# Patient Record
Sex: FEMALE | Race: AMERICAN INDIAN OR ALASKA NATIVE
[De-identification: names, ages, dates, MRNs, and addresses within clinical notes are randomized per-mention and may not be internally consistent; named-entity substitution may affect disease eponyms.]

---

## 2020-07-31 ENCOUNTER — HOSPITAL ENCOUNTER (OUTPATIENT)
Dept: HOSPITAL 5 - ED | Age: 35
Setting detail: OBSERVATION
LOS: 2 days | Discharge: HOME | End: 2020-08-02
Attending: INTERNAL MEDICINE | Admitting: INTERNAL MEDICINE
Payer: SELF-PAY

## 2020-07-31 DIAGNOSIS — F17.210: ICD-10-CM

## 2020-07-31 DIAGNOSIS — N28.9: ICD-10-CM

## 2020-07-31 DIAGNOSIS — E72.20: ICD-10-CM

## 2020-07-31 DIAGNOSIS — F19.10: ICD-10-CM

## 2020-07-31 DIAGNOSIS — R41.82: Primary | ICD-10-CM

## 2020-07-31 DIAGNOSIS — E87.6: ICD-10-CM

## 2020-07-31 LAB
ALBUMIN SERPL-MCNC: 4.2 G/DL (ref 3.9–5)
ALT SERPL-CCNC: 10 UNITS/L (ref 7–56)
BACTERIA #/AREA URNS HPF: (no result) /HPF
BASOPHILS # (AUTO): 0 K/MM3 (ref 0–0.1)
BASOPHILS NFR BLD AUTO: 0.3 % (ref 0–1.8)
BENZODIAZEPINES SCREEN,URINE: (no result)
BILIRUB UR QL STRIP: (no result)
BLOOD UR QL VISUAL: (no result)
BUN SERPL-MCNC: 6 MG/DL (ref 7–17)
BUN/CREAT SERPL: 6 %
CALCIUM SERPL-MCNC: 9.6 MG/DL (ref 8.4–10.2)
EOSINOPHIL # BLD AUTO: 0 K/MM3 (ref 0–0.4)
EOSINOPHIL NFR BLD AUTO: 0.4 % (ref 0–4.3)
HCT VFR BLD CALC: 39.5 % (ref 30.3–42.9)
HEMOLYSIS INDEX: 10
HGB BLD-MCNC: 13.3 GM/DL (ref 10.1–14.3)
LYMPHOCYTES # BLD AUTO: 1.1 K/MM3 (ref 1.2–5.4)
LYMPHOCYTES NFR BLD AUTO: 13.3 % (ref 13.4–35)
MCHC RBC AUTO-ENTMCNC: 34 % (ref 30–34)
MCV RBC AUTO: 91 FL (ref 79–97)
METHADONE SCREEN,URINE: (no result)
MONOCYTES # (AUTO): 0.9 K/MM3 (ref 0–0.8)
MONOCYTES % (AUTO): 10.2 % (ref 0–7.3)
MUCOUS THREADS #/AREA URNS HPF: (no result) /HPF
OPIATE SCREEN,URINE: (no result)
PH UR STRIP: 6 [PH] (ref 5–7)
PLATELET # BLD: 245 K/MM3 (ref 140–440)
RBC # BLD AUTO: 4.33 M/MM3 (ref 3.65–5.03)
RBC #/AREA URNS HPF: 4 /HPF (ref 0–6)
SPERM #/AREA URNS HPF: (no result) /HPF
UROBILINOGEN UR-MCNC: 2 MG/DL (ref ?–2)
WBC #/AREA URNS HPF: 11 /HPF (ref 0–6)

## 2020-07-31 PROCEDURE — 81001 URINALYSIS AUTO W/SCOPE: CPT

## 2020-07-31 PROCEDURE — 80053 COMPREHEN METABOLIC PANEL: CPT

## 2020-07-31 PROCEDURE — 83735 ASSAY OF MAGNESIUM: CPT

## 2020-07-31 PROCEDURE — 96365 THER/PROPH/DIAG IV INF INIT: CPT

## 2020-07-31 PROCEDURE — 84443 ASSAY THYROID STIM HORMONE: CPT

## 2020-07-31 PROCEDURE — 82140 ASSAY OF AMMONIA: CPT

## 2020-07-31 PROCEDURE — 99285 EMERGENCY DEPT VISIT HI MDM: CPT

## 2020-07-31 PROCEDURE — 36415 COLL VENOUS BLD VENIPUNCTURE: CPT

## 2020-07-31 PROCEDURE — G0480 DRUG TEST DEF 1-7 CLASSES: HCPCS

## 2020-07-31 PROCEDURE — 70450 CT HEAD/BRAIN W/O DYE: CPT

## 2020-07-31 PROCEDURE — 85025 COMPLETE CBC W/AUTO DIFF WBC: CPT

## 2020-07-31 PROCEDURE — 73110 X-RAY EXAM OF WRIST: CPT

## 2020-07-31 PROCEDURE — 93005 ELECTROCARDIOGRAM TRACING: CPT

## 2020-07-31 PROCEDURE — 80320 DRUG SCREEN QUANTALCOHOLS: CPT

## 2020-07-31 PROCEDURE — 80307 DRUG TEST PRSMV CHEM ANLYZR: CPT

## 2020-07-31 PROCEDURE — 99406 BEHAV CHNG SMOKING 3-10 MIN: CPT

## 2020-07-31 PROCEDURE — 87086 URINE CULTURE/COLONY COUNT: CPT

## 2020-07-31 PROCEDURE — 84484 ASSAY OF TROPONIN QUANT: CPT

## 2020-07-31 PROCEDURE — 96368 THER/DIAG CONCURRENT INF: CPT

## 2020-07-31 PROCEDURE — 96366 THER/PROPH/DIAG IV INF ADDON: CPT

## 2020-07-31 PROCEDURE — G0378 HOSPITAL OBSERVATION PER HR: HCPCS

## 2020-07-31 PROCEDURE — 96375 TX/PRO/DX INJ NEW DRUG ADDON: CPT

## 2020-07-31 PROCEDURE — 80048 BASIC METABOLIC PNL TOTAL CA: CPT

## 2020-07-31 PROCEDURE — 71045 X-RAY EXAM CHEST 1 VIEW: CPT

## 2020-07-31 PROCEDURE — 82550 ASSAY OF CK (CPK): CPT

## 2020-07-31 PROCEDURE — 96372 THER/PROPH/DIAG INJ SC/IM: CPT

## 2020-07-31 RX ADMIN — POTASSIUM CHLORIDE SCH MLS/HR: 10 INJECTION, SOLUTION INTRAVENOUS at 22:52

## 2020-07-31 RX ADMIN — POTASSIUM CHLORIDE SCH MLS/HR: 10 INJECTION, SOLUTION INTRAVENOUS at 21:47

## 2020-07-31 NOTE — CAT SCAN REPORT
CT head/brain wo con



INDICATION / CLINICAL INFORMATION:

89 years Female; Altered mental status.



TECHNIQUE: Routine CT head without contrast. All CT scans at this location are performed using CT dos
e reduction for ALARA by means of automated exposure control.



COMPARISON: 

None.



FINDINGS:



BRAIN / INTRACRANIAL CONTENTS: No acute hemorrhage, mass effect, midline shift, hydrocephalus, or acu
te, large territorial infarct. No chronic infarct or atrophy appreciated. No significant white matter
 abnormality.



CRANIOCERVICAL JUNCTION: No significant abnormality.



ORBITS: No significant abnormality of visualized orbits.

SINUSES / MASTOIDS: No significant abnormality in the visualized paranasal sinuses or mastoid air pamela
ls.



ADDITIONAL FINDINGS: None. 



IMPRESSION:

1. No focal mass, hemorrhage, hydrocephalus, or acute, large territorial infarct. 



Signer Name: Anderson Fernandez MD, III 

Signed: 7/31/2020 9:51 PM

Workstation Name: [facility] CenterWORKS[facility] [place] [id number]

## 2020-07-31 NOTE — XRAY REPORT
CHEST 1 VIEW 



INDICATION / CLINICAL INFORMATION:

Altered mental status.



COMPARISON: 

None available.



FINDINGS:



SUPPORT DEVICES: None.

HEART / MEDIASTINUM: No significant abnormality. 

LUNGS / PLEURA: No significant pulmonary or pleural abnormality..  No pneumothorax. 



ADDITIONAL FINDINGS: No significant additional findings.



IMPRESSION:

1. No acute findings.



Signer Name: Justin Griffith MD 

Signed: 7/31/2020 9:07 PM

Workstation Name: [id number]

## 2020-07-31 NOTE — EMERGENCY DEPARTMENT REPORT
HPI





- General


Time Seen by Provider: 07/31/20 20:32





- HPI


HPI: 





This is an -American female, who is age could be anywhere between 30 to 

50 years old, who presents via EMS from a local motel with altered mental status

and EMS as excited delirium.  EMS and police were called after the patient was 

found going [id number] at the motel, beating on the door, yelling, and appea

red to be trying to get into different rooms.  The patient had to be physically 

restrained and was given 5 mg of Haldol, 5 mg of Versed, and 50 mg of Benadryl, 

IM, in order to chemically subdue her and bring her in for evaluation.  At this 

time the patient is arousable but does appear altered or confused, possibly 

intoxicated.  She says that her name is Katelynn Bullock, which is the only 

information EMS got as well and cannot be verified.  Otherwise the patient is a 

poor historian.





ED Review of Systems


ROS: 


Stated complaint: AMS


Other details as noted in HPI





Comment: Unobtainable due to pts medical conditions





Physical Exam





- Physical Exam


Physical Exam: 





GENERAL: The patient is well-developed well-nourished.


HENT: Normocephalic.  Atraumatic.    Patient has moist mucous membranes.


EYES: Extraocular motions are intact.  Pupils equal reactive to light bilater

ally.


NECK: Supple. Trachea is midline.


CHEST/LUNGS: Clear to auscultation.  There is no respiratory distress noted.


HEART/CARDIOVASCULAR: Regular.  There is mild tachycardia.  There is no murmur.


ABDOMEN: Abdomen is soft, nontender.  Patient has normal bowel sounds. 


SKIN: Skin is warm and dry. 


NEURO: The patient is sleepy but arousable.  Patient is confused.  Follows some 

commands.  Withdraws from painful stimuli.


MUSCULOSKELETAL: There is no tenderness or deformity.  There is no evidence of 

acute injury.





ED Course





- Reevaluation(s)


Reevaluation #1: 





08/01/20 00:23


                                   Lab Results











  07/31/20 07/31/20 07/31/20 Range/Units





  20:46 20:46 20:46 


 


WBC  8.5    (4.5-11.0)  K/mm3


 


RBC  4.33    (3.65-5.03)  M/mm3


 


Hgb  13.3    (10.1-14.3)  gm/dl


 


Hct  39.5    (30.3-42.9)  %


 


MCV  91    (79-97)  fl


 


MCH  31    (28-32)  pg


 


MCHC  34    (30-34)  %


 


RDW  16.1 H    (13.2-15.2)  %


 


Plt Count  245    (140-440)  K/mm3


 


Lymph % (Auto)  13.3 L    (13.4-35.0)  %


 


Mono % (Auto)  10.2 H    (0.0-7.3)  %


 


Eos % (Auto)  0.4    (0.0-4.3)  %


 


Baso % (Auto)  0.3    (0.0-1.8)  %


 


Lymph #  1.1 L    (1.2-5.4)  K/mm3


 


Mono #  0.9 H    (0.0-0.8)  K/mm3


 


Eos #  0.0    (0.0-0.4)  K/mm3


 


Baso #  0.0    (0.0-0.1)  K/mm3


 


Seg Neutrophils %  75.8 H    (40.0-70.0)  %


 


Seg Neutrophils #  6.4    (1.8-7.7)  K/mm3


 


Sodium   139   (137-145)  mmol/L


 


Potassium   2.7 L*   (3.6-5.0)  mmol/L


 


Chloride   101.9   ()  mmol/L


 


Carbon Dioxide   18 L   (22-30)  mmol/L


 


Anion Gap   22   mmol/L


 


BUN   6 L   (7-17)  mg/dL


 


Creatinine   1.0   (0.6-1.2)  mg/dL


 


Estimated GFR   49   ml/min


 


BUN/Creatinine Ratio   6   %


 


Glucose   122 H   ()  mg/dL


 


Calcium   9.6   (8.4-10.2)  mg/dL


 


Magnesium     (1.7-2.3)  mg/dL


 


Total Bilirubin   0.90   (0.1-1.2)  mg/dL


 


AST   30   (5-40)  units/L


 


ALT   10   (7-56)  units/L


 


Alkaline Phosphatase   64   ()  units/L


 


Ammonia    66.0 H  (25-60)  umol/L


 


Total Creatine Kinase   329 H   ()  units/L


 


Troponin T   < 0.010   (0.00-0.029)  ng/mL


 


Total Protein   7.8   (6.3-8.2)  g/dL


 


Albumin   4.2   (3.9-5)  g/dL


 


Albumin/Globulin Ratio   1.2   %


 


TSH     (0.270-4.200)  mlU/mL


 


Urine Color     (Yellow)  


 


Urine Turbidity     (Clear)  


 


Urine pH     (5.0-7.0)  


 


Ur Specific Gravity     (1.003-1.030)  


 


Urine Protein     (Negative)  mg/dL


 


Urine Glucose (UA)     (Negative)  mg/dL


 


Urine Ketones     (Negative)  mg/dL


 


Urine Blood     (Negative)  


 


Urine Nitrite     (Negative)  


 


Urine Bilirubin     (Negative)  


 


Urine Urobilinogen     (<2.0)  mg/dL


 


Ur Leukocyte Esterase     (Negative)  


 


Urine WBC (Auto)     (0.0-6.0)  /HPF


 


Urine RBC (Auto)     (0.0-6.0)  /HPF


 


U Epithel Cells (Auto)     (0-13.0)  /HPF


 


Urine Bacteria (Auto)     (Negative)  /HPF


 


Urine Mucus     /HPF


 


Urine Sperm     (NP)  /HPF


 


Salicylates     (2.8-20.0)  mg/dL


 


Urine Opiates Screen     


 


Urine Methadone Screen     


 


Acetaminophen     (10.0-30.0)  ug/mL


 


Ur Barbiturates Screen     


 


Ur Phencyclidine Scrn     


 


Ur Amphetamines Screen     


 


U Benzodiazepines Scrn     


 


Urine Cocaine Screen     


 


U Marijuana (THC) Screen     


 


Drugs of Abuse Note     


 


Plasma/Serum Alcohol     (0-0.07)  %














  07/31/20 07/31/20 07/31/20 Range/Units





  20:46 20:46 20:46 


 


WBC     (4.5-11.0)  K/mm3


 


RBC     (3.65-5.03)  M/mm3


 


Hgb     (10.1-14.3)  gm/dl


 


Hct     (30.3-42.9)  %


 


MCV     (79-97)  fl


 


MCH     (28-32)  pg


 


MCHC     (30-34)  %


 


RDW     (13.2-15.2)  %


 


Plt Count     (140-440)  K/mm3


 


Lymph % (Auto)     (13.4-35.0)  %


 


Mono % (Auto)     (0.0-7.3)  %


 


Eos % (Auto)     (0.0-4.3)  %


 


Baso % (Auto)     (0.0-1.8)  %


 


Lymph #     (1.2-5.4)  K/mm3


 


Mono #     (0.0-0.8)  K/mm3


 


Eos #     (0.0-0.4)  K/mm3


 


Baso #     (0.0-0.1)  K/mm3


 


Seg Neutrophils %     (40.0-70.0)  %


 


Seg Neutrophils #     (1.8-7.7)  K/mm3


 


Sodium     (137-145)  mmol/L


 


Potassium     (3.6-5.0)  mmol/L


 


Chloride     ()  mmol/L


 


Carbon Dioxide     (22-30)  mmol/L


 


Anion Gap     mmol/L


 


BUN     (7-17)  mg/dL


 


Creatinine     (0.6-1.2)  mg/dL


 


Estimated GFR     ml/min


 


BUN/Creatinine Ratio     %


 


Glucose     ()  mg/dL


 


Calcium     (8.4-10.2)  mg/dL


 


Magnesium     (1.7-2.3)  mg/dL


 


Total Bilirubin     (0.1-1.2)  mg/dL


 


AST     (5-40)  units/L


 


ALT     (7-56)  units/L


 


Alkaline Phosphatase     ()  units/L


 


Ammonia     (25-60)  umol/L


 


Total Creatine Kinase     ()  units/L


 


Troponin T     (0.00-0.029)  ng/mL


 


Total Protein     (6.3-8.2)  g/dL


 


Albumin     (3.9-5)  g/dL


 


Albumin/Globulin Ratio     %


 


TSH  0.522    (0.270-4.200)  mlU/mL


 


Urine Color     (Yellow)  


 


Urine Turbidity     (Clear)  


 


Urine pH     (5.0-7.0)  


 


Ur Specific Gravity     (1.003-1.030)  


 


Urine Protein     (Negative)  mg/dL


 


Urine Glucose (UA)     (Negative)  mg/dL


 


Urine Ketones     (Negative)  mg/dL


 


Urine Blood     (Negative)  


 


Urine Nitrite     (Negative)  


 


Urine Bilirubin     (Negative)  


 


Urine Urobilinogen     (<2.0)  mg/dL


 


Ur Leukocyte Esterase     (Negative)  


 


Urine WBC (Auto)     (0.0-6.0)  /HPF


 


Urine RBC (Auto)     (0.0-6.0)  /HPF


 


U Epithel Cells (Auto)     (0-13.0)  /HPF


 


Urine Bacteria (Auto)     (Negative)  /HPF


 


Urine Mucus     /HPF


 


Urine Sperm     (NP)  /HPF


 


Salicylates   < 0.3 L   (2.8-20.0)  mg/dL


 


Urine Opiates Screen     


 


Urine Methadone Screen     


 


Acetaminophen    5.0 L  (10.0-30.0)  ug/mL


 


Ur Barbiturates Screen     


 


Ur Phencyclidine Scrn     


 


Ur Amphetamines Screen     


 


U Benzodiazepines Scrn     


 


Urine Cocaine Screen     


 


U Marijuana (THC) Screen     


 


Drugs of Abuse Note     


 


Plasma/Serum Alcohol     (0-0.07)  %














  07/31/20 07/31/20 07/31/20 Range/Units





  20:46 21:55 22:00 


 


WBC     (4.5-11.0)  K/mm3


 


RBC     (3.65-5.03)  M/mm3


 


Hgb     (10.1-14.3)  gm/dl


 


Hct     (30.3-42.9)  %


 


MCV     (79-97)  fl


 


MCH     (28-32)  pg


 


MCHC     (30-34)  %


 


RDW     (13.2-15.2)  %


 


Plt Count     (140-440)  K/mm3


 


Lymph % (Auto)     (13.4-35.0)  %


 


Mono % (Auto)     (0.0-7.3)  %


 


Eos % (Auto)     (0.0-4.3)  %


 


Baso % (Auto)     (0.0-1.8)  %


 


Lymph #     (1.2-5.4)  K/mm3


 


Mono #     (0.0-0.8)  K/mm3


 


Eos #     (0.0-0.4)  K/mm3


 


Baso #     (0.0-0.1)  K/mm3


 


Seg Neutrophils %     (40.0-70.0)  %


 


Seg Neutrophils #     (1.8-7.7)  K/mm3


 


Sodium     (137-145)  mmol/L


 


Potassium     (3.6-5.0)  mmol/L


 


Chloride     ()  mmol/L


 


Carbon Dioxide     (22-30)  mmol/L


 


Anion Gap     mmol/L


 


BUN     (7-17)  mg/dL


 


Creatinine     (0.6-1.2)  mg/dL


 


Estimated GFR     ml/min


 


BUN/Creatinine Ratio     %


 


Glucose     ()  mg/dL


 


Calcium     (8.4-10.2)  mg/dL


 


Magnesium   2.00   (1.7-2.3)  mg/dL


 


Total Bilirubin     (0.1-1.2)  mg/dL


 


AST     (5-40)  units/L


 


ALT     (7-56)  units/L


 


Alkaline Phosphatase     ()  units/L


 


Ammonia     (25-60)  umol/L


 


Total Creatine Kinase     ()  units/L


 


Troponin T     (0.00-0.029)  ng/mL


 


Total Protein     (6.3-8.2)  g/dL


 


Albumin     (3.9-5)  g/dL


 


Albumin/Globulin Ratio     %


 


TSH     (0.270-4.200)  mlU/mL


 


Urine Color    Estela  (Yellow)  


 


Urine Turbidity    Cloudy  (Clear)  


 


Urine pH    6.0  (5.0-7.0)  


 


Ur Specific Gravity    1.026  (1.003-1.030)  


 


Urine Protein    100 mg/dl  (Negative)  mg/dL


 


Urine Glucose (UA)    Neg  (Negative)  mg/dL


 


Urine Ketones    20  (Negative)  mg/dL


 


Urine Blood    Neg  (Negative)  


 


Urine Nitrite    Neg  (Negative)  


 


Urine Bilirubin    Neg  (Negative)  


 


Urine Urobilinogen    2.0  (<2.0)  mg/dL


 


Ur Leukocyte Esterase    Tr  (Negative)  


 


Urine WBC (Auto)    11.0 H  (0.0-6.0)  /HPF


 


Urine RBC (Auto)    4.0  (0.0-6.0)  /HPF


 


U Epithel Cells (Auto)    1.0  (0-13.0)  /HPF


 


Urine Bacteria (Auto)    1+  (Negative)  /HPF


 


Urine Mucus    3+  /HPF


 


Urine Sperm    Few  (NP)  /HPF


 


Salicylates     (2.8-20.0)  mg/dL


 


Urine Opiates Screen     


 


Urine Methadone Screen     


 


Acetaminophen     (10.0-30.0)  ug/mL


 


Ur Barbiturates Screen     


 


Ur Phencyclidine Scrn     


 


Ur Amphetamines Screen     


 


U Benzodiazepines Scrn     


 


Urine Cocaine Screen     


 


U Marijuana (THC) Screen     


 


Drugs of Abuse Note     


 


Plasma/Serum Alcohol  < 0.01    (0-0.07)  %














  07/31/20 Range/Units





  22:00 


 


WBC   (4.5-11.0)  K/mm3


 


RBC   (3.65-5.03)  M/mm3


 


Hgb   (10.1-14.3)  gm/dl


 


Hct   (30.3-42.9)  %


 


MCV   (79-97)  fl


 


MCH   (28-32)  pg


 


MCHC   (30-34)  %


 


RDW   (13.2-15.2)  %


 


Plt Count   (140-440)  K/mm3


 


Lymph % (Auto)   (13.4-35.0)  %


 


Mono % (Auto)   (0.0-7.3)  %


 


Eos % (Auto)   (0.0-4.3)  %


 


Baso % (Auto)   (0.0-1.8)  %


 


Lymph #   (1.2-5.4)  K/mm3


 


Mono #   (0.0-0.8)  K/mm3


 


Eos #   (0.0-0.4)  K/mm3


 


Baso #   (0.0-0.1)  K/mm3


 


Seg Neutrophils %   (40.0-70.0)  %


 


Seg Neutrophils #   (1.8-7.7)  K/mm3


 


Sodium   (137-145)  mmol/L


 


Potassium   (3.6-5.0)  mmol/L


 


Chloride   ()  mmol/L


 


Carbon Dioxide   (22-30)  mmol/L


 


Anion Gap   mmol/L


 


BUN   (7-17)  mg/dL


 


Creatinine   (0.6-1.2)  mg/dL


 


Estimated GFR   ml/min


 


BUN/Creatinine Ratio   %


 


Glucose   ()  mg/dL


 


Calcium   (8.4-10.2)  mg/dL


 


Magnesium   (1.7-2.3)  mg/dL


 


Total Bilirubin   (0.1-1.2)  mg/dL


 


AST   (5-40)  units/L


 


ALT   (7-56)  units/L


 


Alkaline Phosphatase   ()  units/L


 


Ammonia   (25-60)  umol/L


 


Total Creatine Kinase   ()  units/L


 


Troponin T   (0.00-0.029)  ng/mL


 


Total Protein   (6.3-8.2)  g/dL


 


Albumin   (3.9-5)  g/dL


 


Albumin/Globulin Ratio   %


 


TSH   (0.270-4.200)  mlU/mL


 


Urine Color   (Yellow)  


 


Urine Turbidity   (Clear)  


 


Urine pH   (5.0-7.0)  


 


Ur Specific Gravity   (1.003-1.030)  


 


Urine Protein   (Negative)  mg/dL


 


Urine Glucose (UA)   (Negative)  mg/dL


 


Urine Ketones   (Negative)  mg/dL


 


Urine Blood   (Negative)  


 


Urine Nitrite   (Negative)  


 


Urine Bilirubin   (Negative)  


 


Urine Urobilinogen   (<2.0)  mg/dL


 


Ur Leukocyte Esterase   (Negative)  


 


Urine WBC (Auto)   (0.0-6.0)  /HPF


 


Urine RBC (Auto)   (0.0-6.0)  /HPF


 


U Epithel Cells (Auto)   (0-13.0)  /HPF


 


Urine Bacteria (Auto)   (Negative)  /HPF


 


Urine Mucus   /HPF


 


Urine Sperm   (NP)  /HPF


 


Salicylates   (2.8-20.0)  mg/dL


 


Urine Opiates Screen  Presumptive negative  


 


Urine Methadone Screen  Presumptive negative  


 


Acetaminophen   (10.0-30.0)  ug/mL


 


Ur Barbiturates Screen  Presumptive negative  


 


Ur Phencyclidine Scrn  Presumptive negative  


 


Ur Amphetamines Screen  Presumptive positive  


 


U Benzodiazepines Scrn  Presumptive positive  


 


Urine Cocaine Screen  Presumptive positive  


 


U Marijuana (THC) Screen  Presumptive positive  


 


Drugs of Abuse Note  Disclamer  


 


Plasma/Serum Alcohol   (0-0.07)  %














ED Medical Decision Making





- Lab Data


Result diagrams: 


                                 07/31/20 20:46





                                 07/31/20 20:46





- EKG Data


-: EKG Interpreted by Me


EKG shows normal: sinus rhythm, axis, intervals (prolonged QTc interval), QRS 

complexes (LVH), ST-T waves


Rate: tachycardia (104 bpm)





- EKG Data


When compared to previous EKG there are: previous EKG unavailable


Interpretation: other (Sinus tachycardia at 104 bpm, LVH, prolonged QTc 

interval)





- Radiology Data


Radiology results: report reviewed, image reviewed


interpreted by me: 





Chest x-ray does not show any acute process.  There are no pleural effusions, 

obvious pneumonia and there is no pneumothorax.





CT head/brain wo con INDICATION / CLINICAL INFORMATION: 89 years Female; 

Altered mental status. TECHNIQUE: Routine CT head without contrast. All CT scans

at this location are performed using CT dose reduction for ALARA by means of 

automated exposure control. COMPARISON: None. FINDINGS: BRAIN / INTRACRANIAL 

CONTENTS: No acute hemorrhage, mass effect, midline shift, hydrocephalus, or 

acute, large territorial infarct. No chronic infarct or atrophy appreciated. No 

significant white matter abnormality. CRANIOCERVICAL JUNCTION: No significant 

abnormality. ORBITS: No significant abnormality of visualized orbits. SINUSES / 

MASTOIDS: No significant abnormality in the visualized paranasal sinuses or 

mastoid air cells. ADDITIONAL FINDINGS: None. IMPRESSION: 1. No focal mass, 

hemorrhage, hydrocephalus, or acute, large territorial infarct. 





- Medical Decision Making





This patient presents to the emergency department for evaluation of altered 

mental status and apparently had some excited delirium.  The patient was seen at

a local motel yelling, banging on doors, and appearing to try to get into other 

peoples rooms.  She required some chemical sedation by EMS prior to arrival.  

Upon arrival, the patient is calm but is still altered.  The only information 

she gives, or the only thing that she said to me, is giving us the wrong name.  

The patient was listed as a medical emergency but it appears that registration 

was able to verify her identification at some point.  A CT scan of the head 

without contrast did not show any bleed, shift, mass, ischemia, or any other 

acute process.  EKG did not show any morphology consistent with ST elevation MI.

 Patient's labs show some mild renal insufficiency with a GFR of 49, hypokalemia

with a potassium of 2.7, slightly elevated ammonia level, and a urine drug 

screen positive for amphetamines, cocaine, benzodiazepines and marijuana.  

Patient was given some IV fluid resuscitation, potassium chloride replacement.  

She will be admitted to the hospital for further evaluation and treatment and 

was accepted for admission by the hospitalist, Dr. Mahoney. 


Critical Care Time: No


Critical care attestation.: 


If time is entered above; I have spent that time in minutes in the direct care 

of this critically ill patient, excluding procedure time.








ED Disposition


Clinical Impression: 


 Polysubstance abuse, Hypokalemia, Hyperammonemia, Mild renal insufficiency





Altered mental status


Qualifiers:


 Altered mental status type: unspecified Qualified Code(s): R41.82 - Altered 

mental status, unspecified





Disposition: DC-09 OP ADMIT IP TO THIS HOSP


Is pt being admited?: Yes


Condition: Serious


Time of Disposition: 22:56

## 2020-08-01 LAB
BUN SERPL-MCNC: 4 MG/DL (ref 7–17)
BUN/CREAT SERPL: 6 %
CALCIUM SERPL-MCNC: 8.6 MG/DL (ref 8.4–10.2)
HEMOLYSIS INDEX: 14

## 2020-08-01 RX ADMIN — LACTULOSE SCH GM: 20 SOLUTION ORAL at 13:19

## 2020-08-01 RX ADMIN — LACTULOSE SCH: 20 SOLUTION ORAL at 03:11

## 2020-08-01 RX ADMIN — HEPARIN SODIUM SCH UNIT: 5000 INJECTION, SOLUTION INTRAVENOUS; SUBCUTANEOUS at 00:50

## 2020-08-01 RX ADMIN — POTASSIUM CHLORIDE SCH MLS/HR: 10 INJECTION, SOLUTION INTRAVENOUS at 01:55

## 2020-08-01 RX ADMIN — POTASSIUM CHLORIDE SCH MLS/HR: 10 INJECTION, SOLUTION INTRAVENOUS at 00:50

## 2020-08-01 RX ADMIN — HEPARIN SODIUM SCH: 5000 INJECTION, SOLUTION INTRAVENOUS; SUBCUTANEOUS at 21:54

## 2020-08-01 RX ADMIN — HEPARIN SODIUM SCH UNIT: 5000 INJECTION, SOLUTION INTRAVENOUS; SUBCUTANEOUS at 10:13

## 2020-08-01 NOTE — XRAY REPORT
LEFT WRIST 3 VIEWS [id number] 



INDICATION: right wrist pain



COMPARISON: None available.



FINDINGS: No fractures or dislocations are seen.







Signer Name: Dada Escobar MD 

Signed: 8/1/2020 3:00 AM

Workstation Name: [id number]

## 2020-08-01 NOTE — CONSULTATION
History of Present Illness





- Reason for Consult


Consult date: 08/01/20


Reason for consult: Bizarre Behaviors





- Chief Complaint


Chief complaint: 





Altered mental Status 





- History of Present Psychiatric Illness


During my interview with the patient she is lying in bed with her eyes close. 

She is uncooperative, and avoidant. She is oriented x 3. The patient has to be 

frequently touched for her to respond to the questioning. She is not 

forthcoming. She says she was brought to the hospital because "they told me I 

was trying to get into a hotel." The patient denies SI/HI or hallucinations of 

any kind. When asked about illicit drug use, she is reluctant to answer. She 

then says "cocaine" sometimes. She describes her mood as "okay." The patient 

says she has a past history of schizophrenia. She says she takes "geodon." She 

denies past suicidal attempts.





PAST PSYCHIATRIC HISTORY


Diagnoses: Schizophrenia


Suicide attempts or Self-harm behavior: Denies


Prior psychiatric hospitalizations: Once


Substance Abuse history: "cocaine"


Previous psychiatric medications tried: denies


Outpatient treatment: once





PAST MEDICAL HISTORY: None reported





Family Psychiatric History: None reported or documented





SOCIAL HISTORY


Marital Status: single


Living Arrangements: with boyfriend


Employment Status: Unemployed


Access to guns/weapons: Denies


Education: High school


History of Abuse: Denies


Legal History: Denies





REVIEW OF SYSTEMS


Constitutional: Negative for weight loss


ENT: Negative for stridor


Respiratory: Negative for cough or hemoptysis


All other systems reviewed and are negative





MENTAL STATUS EXAMINATION


General Appearance and Behavior: Age appropriate 


Mood: "okay"


Affect and affective range: congruent with mood


Thought Process: Goal directed


Speech: Normal volume and pace


Thought Content


     Suicidal Ideation: Denies at present


     Homicidal Ideation: Denies HI


     Hallucinations: Denies


     Delusions: non elicited  


Memory/Cognition: Limited


Attention: Normal





RECOMMENDATIONS


Substance Induced Mood Disorder





MEDICATIONS:


No scripts given


Risks, benefits and alternatives of medications discussed with the patient, 

questions answered and consent obtained from patient.


PSYCHOTHERAPY: Supportive psychotherapy provided


MEDICAL: Per primary team


DELIRIUM PRECAUTIONS: Please re-orient patient frequently, keep lights on during

the day, and minimize benzodiazepines and opiates as these medications could 

worsen patient's confusion.


SAFETY SITTER: Per medical team


DISPOSITION: Do not recommend acute inpatient psychiatry at this time. Can 

discharge home once medically clear


The  to give the patient resources for outpatient services for psych, 

cognitive behavior therapy and drug rehab


The patient should abstain from all illicit drug use and alcohol.


Will sign off. Thank you for the consult. Please contact with any questions 

and/or concerns.








Medications and Allergies


                                    Allergies











Allergy/AdvReac Type Severity Reaction Status Date / Time


 


No Known Allergies Allergy   Unverified 08/01/20 13:24











Active Meds: 


Active Medications





Heparin Sodium (Porcine) (Heparin)  5,000 unit SUB-Q Q12HR SHERRY


   Last Admin: 08/01/20 10:13 Dose:  5,000 unit


   Documented by: 











Mental Status Exam





- Vital signs


                                Last Vital Signs











Temp  98.9 F   08/01/20 11:31


 


Pulse  89   08/01/20 11:31


 


Resp  20   08/01/20 11:31


 


BP  112/51   08/01/20 11:31


 


Pulse Ox  96   08/01/20 11:31














Results


Result Diagrams: 


                                 07/31/20 20:46





                                 08/01/20 06:48


                              Abnormal lab results











  07/31/20 07/31/20 07/31/20 Range/Units





  20:46 20:46 20:46 


 


RDW  16.1 H    (13.2-15.2)  %


 


Lymph % (Auto)  13.3 L    (13.4-35.0)  %


 


Mono % (Auto)  10.2 H    (0.0-7.3)  %


 


Lymph #  1.1 L    (1.2-5.4)  K/mm3


 


Mono #  0.9 H    (0.0-0.8)  K/mm3


 


Seg Neutrophils %  75.8 H    (40.0-70.0)  %


 


Potassium   2.7 L*   (3.6-5.0)  mmol/L


 


Chloride     ()  mmol/L


 


Carbon Dioxide   18 L   (22-30)  mmol/L


 


BUN   6 L   (7-17)  mg/dL


 


Glucose   122 H   ()  mg/dL


 


Ammonia    66.0 H  (25-60)  umol/L


 


Total Creatine Kinase   329 H   ()  units/L


 


Urine WBC (Auto)     (0.0-6.0)  /HPF


 


Salicylates     (2.8-20.0)  mg/dL


 


Acetaminophen     (10.0-30.0)  ug/mL














  07/31/20 07/31/20 07/31/20 Range/Units





  20:46 20:46 22:00 


 


RDW     (13.2-15.2)  %


 


Lymph % (Auto)     (13.4-35.0)  %


 


Mono % (Auto)     (0.0-7.3)  %


 


Lymph #     (1.2-5.4)  K/mm3


 


Mono #     (0.0-0.8)  K/mm3


 


Seg Neutrophils %     (40.0-70.0)  %


 


Potassium     (3.6-5.0)  mmol/L


 


Chloride     ()  mmol/L


 


Carbon Dioxide     (22-30)  mmol/L


 


BUN     (7-17)  mg/dL


 


Glucose     ()  mg/dL


 


Ammonia     (25-60)  umol/L


 


Total Creatine Kinase     ()  units/L


 


Urine WBC (Auto)    11.0 H  (0.0-6.0)  /HPF


 


Salicylates  < 0.3 L    (2.8-20.0)  mg/dL


 


Acetaminophen   5.0 L   (10.0-30.0)  ug/mL














  08/01/20 Range/Units





  06:48 


 


RDW   (13.2-15.2)  %


 


Lymph % (Auto)   (13.4-35.0)  %


 


Mono % (Auto)   (0.0-7.3)  %


 


Lymph #   (1.2-5.4)  K/mm3


 


Mono #   (0.0-0.8)  K/mm3


 


Seg Neutrophils %   (40.0-70.0)  %


 


Potassium   (3.6-5.0)  mmol/L


 


Chloride  108.2 H  ()  mmol/L


 


Carbon Dioxide  20 L  (22-30)  mmol/L


 


BUN  4 L  (7-17)  mg/dL


 


Glucose   ()  mg/dL


 


Ammonia   (25-60)  umol/L


 


Total Creatine Kinase   ()  units/L


 


Urine WBC (Auto)   (0.0-6.0)  /HPF


 


Salicylates   (2.8-20.0)  mg/dL


 


Acetaminophen   (10.0-30.0)  ug/mL








All other labs normal.

## 2020-08-01 NOTE — DISCHARGE SUMMARY
Providers





- Providers


Date of Admission: 


07/31/20 22:56





Date of discharge: 08/01/20


Attending physician: 


KASHIF AKERS





Primary care physician: 


PRIMARY CARE MD








Hospitalization


Reason for admission: polysubstance abuse


Condition: Serious


Hospital course: 


35 year old female with no prior medical history brought in by police and EMS 

after she was found going from one motel room to the other yelling and 

disturbing the people in the area.There was no history of fever , shortness of 

breath or cough.  Here patient had urine positive for cocaine, methamphetamine. 

Her labs showed hypokalemia potassium of 2.7 and slight hyperammonemia.  Patient

was placed on observation.  Patient stayed overnight and this afternoon, patient

is awake and alert recall what happened yesterday saying that she was at a party

and had some drinks and also did drugs.  Patient has been seen by psychiatry and

has been cleared for discharge from psych point of view.  I believe patient is 

stable to go home today.  She has been advised to stop recreational drug use.





Disposition: DC-01 TO HOME OR SELFCARE





Core Measure Documentation





- Palliative Care


Palliative Care/ Comfort Measures: Not Applicable





- Core Measures


Any of the following diagnoses?: none





Exam





- Constitutional


Vitals: 


                                        











Temp Pulse Resp BP Pulse Ox


 


 98.8 F   89   20   117/72   98 


 


 08/01/20 15:15  08/01/20 15:15  08/01/20 15:15  08/01/20 15:15  08/01/20 15:15














Plan


Diet: regular


Additional Instructions: Continue psych medications.  Follow up with psychiatry 

in the office.  Abstain from drug abuse


Follow up with: 


PRIMARY CARE,MD [Primary Care Provider] - 3-5 Days

## 2020-08-01 NOTE — HISTORY AND PHYSICAL REPORT
History of Present Illness


Date of examination: 07/31/20


Date of admission: 


07/31/20 22:56





Chief complaint: 





Altered mental Status 


History of present illness: 





35 year old female brought in by police and EMS after she was found going from 

one motel room to the other yelling and disturbing the people in the area.There 

was no history of fever , shortness of breath or cough.





Past History


Past Medical History: other (DRUG ABUSE)


Past Surgical History: No surgical history


Social history: alcohol abuse (ILLICIT DRUG ABUSE)


Family history: no significant family history





Medications and Allergies


                                    Allergies











Allergy/AdvReac Type Severity Reaction Status Date / Time


 


Unable to Assess Allergy   Unverified 07/31/20 21:11











Active Meds: 


Active Medications





Heparin Sodium (Porcine) (Heparin)  5,000 unit SUB-Q Q12HR [facility]


   Last Admin: 08/01/20 00:50 Dose:  5,000 unit


   Documented by: 


Lactulose (Cephulac)  20 gm PO Q12H [facility]


   Stop: 08/01/20 13:01


   Last Admin: 08/01/20 03:11 Dose:  Not Given


   Documented by: 











Review of Systems


Constitutional: weakness, no weight loss, no weight gain, no fever, no chills, 

no sweats, no night sweats, no anorexia, no fatigue, no malaise


Eyes: bilateral: other (BILATERAL EYE SYMPTOM)


Ears, nose, mouth and throat: no ear pain, no ear discharge, no decreased hear

ing, no nose pain, no nasal congestion, no nasal discharge, no sinus pressure, 

no dental pain, no mouth pain, no dysphagia, no hoarseness, no sore throat, no 

swelling in mouth, no headache, no vertigo


Breasts: deferred


Cardiovascular: no chest pain, no orthopnea, no palpitations, no rapid/irregular

heart beat, no edema, no syncope, no lightheadedness, no claudication


Respiratory: no cough, no excessive sputum, no hemoptysis, no shortness of 

breath, no dyspnea on exertion, no wheezing, no pleurisy


Gastrointestinal: no abdominal pain, no nausea, no vomiting, no diarrhea, no 

constipation, no change in bowel habits, no hematemesis, no loss of appetite


Genitourinary Female: no pelvic pain, no flank pain, no urinary frequency, no 

stress incontinence, no incomplete emptying, no vaginal itching, no vaginal 

dryness, no decreased libido


Menstruation: no period normal, no period heavy, no period spotting


Rectal: no pain, no itching


Musculoskeletal: no neck stiffness, no neck pain, no shooting arm pain, no arm 

numbness/tingling, no low back pain, no shooting leg pain, no morning stiffness,

no muscle weakness, no muscle cramps


Integumentary: no rash, no pruritis, no redness, no sores, no wounds, no bullae,

no lesions, no darkening of skin


Neurological: no head injury, no paralysis, no weakness, no parathesias, no 

numbness, no tingling, no seizures, no syncope, no tremors, no vertigo, no 

headaches, no migraines, no convulsions


Psychiatric: no anxiety, no memory loss, no change in sleep habits, no sleep 

disturbances, no insomnia, no hypersomnia, no change in appetite, no change in 

libido


Endocrine: no cold intolerance, no heat intolerance, no polyphagia, no 

polydipsia, no polyuria, no nocturia


Hematologic/Lymphatic: no easy bruising, no easy bleeding, no lymphadenopathy, 

no lymphedema


Allergic/Immunologic: no persistent infections, no anaphylaxis





Exam





- Constitutional


Vitals: 


                                        











Temp Pulse Resp BP Pulse Ox


 


 99.1 F   86   18   105/67   98 


 


 08/01/20 05:16  08/01/20 05:16  08/01/20 05:16  08/01/20 05:16  08/01/20 05:16











General appearance: Present: no acute distress





- EENT


Eyes: Present: PERRL, EOM intact


ENT: hearing intact, clear oral mucosa, dentition normal





- Neck


Neck: Present: supple, normal ROM





- Respiratory


Respiratory effort: normal





- Cardiovascular


Rhythm: regular


Heart Sounds: Present: S1 & S2, gallop.  Absent: systolic murmur, diastolic 

murmur





- Extremities


Extremities: no ischemia, No edema


Peripheral Pulses: within normal limits





- Abdominal


General gastrointestinal: Present: soft, non-tender, non-distended.  Absent: 

tender, distended, rigid, hepatomegaly, splenomegaly, mass, hernia


Female genitourinary: Present: deferred





- Rectal


Rectal Exam: deferred





- Integumentary


Integumentary: Present: clear, warm, dry.  Absent: jaundice, rash





- Musculoskeletal


Musculoskeletal: strength equal bilaterally





- Psychiatric


Psychiatric: appropriate mood/affect





HEART Score





- HEART Score


Age: < 45


Risk factors: No known risk factors


Troponin: 


                                        











Troponin T  < 0.010 ng/mL (0.00-0.029)   07/31/20  20:46    














- Critical Actions


Critical Actions: 0-3 pts:0.9-1.7%risk of adverse cardiac event.Candidate for 

discharge





Results





- Labs


CBC & Chem 7: 


                                 07/31/20 20:46





                                 07/31/20 20:46


Labs: 


                             Laboratory Last Values











WBC  8.5 K/mm3 (4.5-11.0)   07/31/20  20:46    


 


RBC  4.33 M/mm3 (3.65-5.03)   07/31/20  20:46    


 


Hgb  13.3 gm/dl (10.1-14.3)   07/31/20  20:46    


 


Hct  39.5 % (30.3-42.9)   07/31/20  20:46    


 


MCV  91 fl (79-97)   07/31/20  20:46    


 


MCH  31 pg (28-32)   07/31/20  20:46    


 


MCHC  34 % (30-34)   07/31/20  20:46    


 


RDW  16.1 % (13.2-15.2)  H  07/31/20  20:46    


 


Plt Count  245 K/mm3 (140-440)   07/31/20  20:46    


 


Lymph % (Auto)  13.3 % (13.4-35.0)  L  07/31/20  20:46    


 


Mono % (Auto)  10.2 % (0.0-7.3)  H  07/31/20  20:46    


 


Eos % (Auto)  0.4 % (0.0-4.3)   07/31/20  20:46    


 


Baso % (Auto)  0.3 % (0.0-1.8)   07/31/20  20:46    


 


Lymph #  1.1 K/mm3 (1.2-5.4)  L  07/31/20  20:46    


 


Mono #  0.9 K/mm3 (0.0-0.8)  H  07/31/20  20:46    


 


Eos #  0.0 K/mm3 (0.0-0.4)   07/31/20  20:46    


 


Baso #  0.0 K/mm3 (0.0-0.1)   07/31/20  20:46    


 


Seg Neutrophils %  75.8 % (40.0-70.0)  H  07/31/20  20:46    


 


Seg Neutrophils #  6.4 K/mm3 (1.8-7.7)   07/31/20  20:46    


 


Sodium  139 mmol/L (137-145)   07/31/20  20:46    


 


Potassium  2.7 mmol/L (3.6-5.0)  L*  07/31/20  20:46    


 


Chloride  101.9 mmol/L ()   07/31/20  20:46    


 


Carbon Dioxide  18 mmol/L (22-30)  L  07/31/20  20:46    


 


Anion Gap  22 mmol/L  07/31/20  20:46    


 


BUN  6 mg/dL (7-17)  L  07/31/20  20:46    


 


Creatinine  1.0 mg/dL (0.6-1.2)   07/31/20  20:46    


 


Estimated GFR  49 ml/min  07/31/20  20:46    


 


BUN/Creatinine Ratio  6 %  07/31/20  20:46    


 


Glucose  122 mg/dL ()  H  07/31/20  20:46    


 


Calcium  9.6 mg/dL (8.4-10.2)   07/31/20  20:46    


 


Magnesium  2.00 mg/dL (1.7-2.3)   07/31/20  21:55    


 


Total Bilirubin  0.90 mg/dL (0.1-1.2)   07/31/20  20:46    


 


AST  30 units/L (5-40)   07/31/20  20:46    


 


ALT  10 units/L (7-56)   07/31/20  20:46    


 


Alkaline Phosphatase  64 units/L ()   07/31/20  20:46    


 


Ammonia  66.0 umol/L (25-60)  H  07/31/20  20:46    


 


Total Creatine Kinase  329 units/L ()  H  07/31/20  20:46    


 


Troponin T  < 0.010 ng/mL (0.00-0.029)   07/31/20  20:46    


 


Total Protein  7.8 g/dL (6.3-8.2)   07/31/20  20:46    


 


Albumin  4.2 g/dL (3.9-5)   07/31/20  20:46    


 


Albumin/Globulin Ratio  1.2 %  07/31/20  20:46    


 


TSH  0.522 mlU/mL (0.270-4.200)   07/31/20  20:46    


 


Urine Color  Estela  (Yellow)   07/31/20  22:00    


 


Urine Turbidity  Cloudy  (Clear)   07/31/20  22:00    


 


Urine pH  6.0  (5.0-7.0)   07/31/20  22:00    


 


Ur Specific Gravity  1.026  (1.003-1.030)   07/31/20  22:00    


 


Urine Protein  100 mg/dl mg/dL (Negative)   07/31/20  22:00    


 


Urine Glucose (UA)  Neg mg/dL (Negative)   07/31/20  22:00    


 


Urine Ketones  20 mg/dL (Negative)   07/31/20  22:00    


 


Urine Blood  Neg  (Negative)   07/31/20  22:00    


 


Urine Nitrite  Neg  (Negative)   07/31/20  22:00    


 


Urine Bilirubin  Neg  (Negative)   07/31/20  22:00    


 


Urine Urobilinogen  2.0 mg/dL (<2.0)   07/31/20  22:00    


 


Ur Leukocyte Esterase  Tr  (Negative)   07/31/20  22:00    


 


Urine WBC (Auto)  11.0 /HPF (0.0-6.0)  H  07/31/20  22:00    


 


Urine RBC (Auto)  4.0 /HPF (0.0-6.0)   07/31/20  22:00    


 


U Epithel Cells (Auto)  1.0 /HPF (0-13.0)   07/31/20  22:00    


 


Urine Bacteria (Auto)  1+ /HPF (Negative)   07/31/20  22:00    


 


Urine Mucus  3+ /HPF  07/31/20  22:00    


 


Urine Sperm  Few /HPF (NP)   07/31/20  22:00    


 


Salicylates  < 0.3 mg/dL (2.8-20.0)  L  07/31/20  20:46    


 


Urine Opiates Screen  Presumptive negative   07/31/20  22:00    


 


Urine Methadone Screen  Presumptive negative   07/31/20  22:00    


 


Acetaminophen  5.0 ug/mL (10.0-30.0)  L  07/31/20  20:46    


 


Ur Barbiturates Screen  Presumptive negative   07/31/20  22:00    


 


Ur Phencyclidine Scrn  Presumptive negative   07/31/20  22:00    


 


Ur Amphetamines Screen  Presumptive positive   07/31/20  22:00    


 


U Benzodiazepines Scrn  Presumptive positive   07/31/20  22:00    


 


Urine Cocaine Screen  Presumptive positive   07/31/20  22:00    


 


U Marijuana (THC) Screen  Presumptive positive   07/31/20  22:00    


 


Drugs of Abuse Note  Disclamer   07/31/20  22:00    


 


Plasma/Serum Alcohol  < 0.01 % (0-0.07)   07/31/20  20:46    











Olivier/IV: 


                                        





Voiding Method                   Toilet


IV Catheter Type [Right          INT / Saline Lock


Antecubital]                     











Assessment and Plan





- Patient Problems


(1) Altered mental status


Current Visit: Yes   Status: Acute   


Qualifiers: 


   Altered mental status type: unspecified   Qualified Code(s): R41.82 - Altered

mental status, unspecified   


Plan to address problem: 


1. OXYGEN BY NASAL CANNULA


 2. I.V ATIVAN PRN AGITATION








(2) Hyperammonemia


Current Visit: Yes   Status: Acute   


Plan to address problem: 


LACTULOSE PO








(3) Hypokalemia


Current Visit: Yes   Status: Acute   


Plan to address problem: 


1. I.V KCL REPLACEMENT


 2. BMP MORNITORING








(4) Polysubstance abuse


Current Visit: Yes   Status: Acute   


Plan to address problem: 


I.V ATIVAN PRN AGITATION

## 2020-08-02 VITALS — DIASTOLIC BLOOD PRESSURE: 54 MMHG | SYSTOLIC BLOOD PRESSURE: 108 MMHG

## 2020-08-02 NOTE — DISCHARGE SUMMARY
Providers





- Providers


Date of Admission: 


07/31/20 22:56





Date of discharge: 08/02/20


Attending physician: 


KASHIF AKERS





Primary care physician: 


PRIMARY CARE MD








Hospitalization


Condition: Serious


Hospital course: 





35 year old female with no prior medical history brought in by police and EMS 

after she was found going from one motel room to the other yelling and dist

urbing the people in the area.There was no history of fever , shortness of 

breath or cough.  Here patient had urine positive for cocaine, methamphetamine. 

Her labs showed hypokalemia potassium of 2.7 and slight hyperammonemia.  Patient

was placed on observation.  Patient stayed overnight and this afternoon, patient

is awake and alert recall what happened yesterday saying that she was at a party

and had some drinks and also did drugs.  Patient has been seen by psychiatry and

has been cleared for discharge from psych point of view.  She has been advised 

to stop recreational drug use.





Patient was planned for discharge on 8/1 but held due to 1013 order in place and

patients address could not be confirmed as per RN.





8/2. Patient can be discharged today. She lives in [place] but usually comes 

to [place] to see her boyfriend. She is alert and oriented x3. Has good judgement and

insight. She will be going to her boyfriends home from the hospital. Discussed 

with RN





Disposition: DC-01 TO HOME OR SELFCARE





Core Measure Documentation





- Palliative Care


Palliative Care/ Comfort Measures: Not Applicable





- Core Measures


Any of the following diagnoses?: none





Exam





- Constitutional


Vitals: 


                                        











Temp Pulse Resp BP Pulse Ox


 


 98.6 F   88   20   108/54   98 


 


 08/02/20 07:57  08/02/20 07:57  08/02/20 07:57  08/02/20 07:57  08/02/20 07:57











General appearance: Present: no acute distress, well-nourished





- EENT


Eyes: Present: PERRL


ENT: hearing intact, clear oral mucosa





- Neck


Neck: Present: supple, normal ROM





- Respiratory


Respiratory effort: normal


Respiratory: bilateral: CTA





- Cardiovascular


Heart Sounds: Present: S1 & S2.  Absent: rub, click





- Extremities


Extremities: pulses symmetrical, No edema


Peripheral Pulses: within normal limits





- Abdominal


General gastrointestinal: Present: soft, non-tender, non-distended, normal bowel

sounds


Female genitourinary: Present: normal





- Integumentary


Integumentary: Present: clear, warm, dry





- Musculoskeletal


Musculoskeletal: gait normal, strength equal bilaterally





- Psychiatric


Psychiatric: appropriate mood/affect, intact judgment & insight





- Neurologic


Neurologic: CNII-XII intact, moves all extremities





Plan


Additional Instructions: Continue psych medications.  Follow up with psychiatry 

in the office.  Abstain from drug abuse


Follow up with: 


PRIMARY CARE,MD [Primary Care Provider] - 3-5 Days